# Patient Record
Sex: MALE | URBAN - METROPOLITAN AREA
[De-identification: names, ages, dates, MRNs, and addresses within clinical notes are randomized per-mention and may not be internally consistent; named-entity substitution may affect disease eponyms.]

---

## 2023-09-29 ENCOUNTER — APPOINTMENT (RX ONLY)
Dept: URBAN - METROPOLITAN AREA CLINIC 173 | Facility: CLINIC | Age: 48
Setting detail: DERMATOLOGY
End: 2023-09-29

## 2023-09-29 DIAGNOSIS — Z41.9 ENCOUNTER FOR PROCEDURE FOR PURPOSES OTHER THAN REMEDYING HEALTH STATE, UNSPECIFIED: ICD-10-CM

## 2023-09-29 PROCEDURE — ? CONSULTATION FOR SCAR REVISION

## 2023-09-29 ASSESSMENT — LOCATION ZONE DERM: LOCATION ZONE: FACE

## 2023-09-29 ASSESSMENT — LOCATION SIMPLE DESCRIPTION DERM: LOCATION SIMPLE: RIGHT FOREHEAD

## 2023-09-29 ASSESSMENT — LOCATION DETAILED DESCRIPTION DERM: LOCATION DETAILED: RIGHT SUPERIOR FOREHEAD

## 2023-09-29 NOTE — HPI: COSMETIC CONSULTATION
Additional History: Pt states he got cut at a [employer] shop with a straight edge razor 2w ago. Injury has left a scar. Pt interested in further treatment options for scar revision.

## 2023-09-29 NOTE — PROCEDURE: CONSULTATION FOR SCAR REVISION
X Size Of Scar In Cm (Optional): 0
Detail Level: Detailed
Other Plan: Explained to patient that due to recency of scar development (patient reported 2-3 weeks old), unsure of depth of scar and therefore how scar will ultimately heal with time. Without knowing depth, unsure if scar revision will even be necessary. Will plan to reevaluate around 3 month jan after scar has had more time to heal. \\n\\nDuring this time of healing, recommended keeping covered with aquaphor ointment and bandage x 1 month. Diligent sun protection is important during this time. After this month period, patient encouraged to use silicon scar treatment daily with spf until 3 month jan.